# Patient Record
(demographics unavailable — no encounter records)

---

## 2024-11-23 NOTE — REVIEW OF SYSTEMS
[Fever] : no fever [Chills] : no chills [Vision Problems] : no vision problems [Earache] : no earache [Chest Pain] : no chest pain [Shortness Of Breath] : no shortness of breath [Cough] : cough [Abdominal Pain] : no abdominal pain [Vomiting] : no vomiting [Skin Rash] : no skin rash

## 2024-11-23 NOTE — PHYSICAL EXAM
[de-identified] : This was a telephonic evaluation only as patient could not access camera on on her smart device

## 2024-11-23 NOTE — HISTORY OF PRESENT ILLNESS
[Home] : at home, [unfilled] , at the time of the visit. [Other Location: e.g. Home (Enter Location, City,State)___] : at [unfilled] [FreeTextEntry8] : 39-year-old female with no significant past medical history on no prescribed meds, non-smoker complaining of worsening cough which started approximately 5 days ago now with productive green sputum.  Patient states she is taking over-the-counter cough and cold medications including NyQuil without relief.  Patient states her 2 children were diagnosed with walking pneumonia and subsequently placed on antibiotics as well.

## 2024-11-23 NOTE — ASSESSMENT
[FreeTextEntry1] : Patient was unable to access the camera on her smart device so this was a telephonic evaluation only Impression: Acute bronchitis